# Patient Record
Sex: FEMALE | Race: BLACK OR AFRICAN AMERICAN
[De-identification: names, ages, dates, MRNs, and addresses within clinical notes are randomized per-mention and may not be internally consistent; named-entity substitution may affect disease eponyms.]

---

## 2019-11-27 ENCOUNTER — HOSPITAL ENCOUNTER (EMERGENCY)
Dept: HOSPITAL 35 - ER | Age: 39
Discharge: HOME | End: 2019-11-27
Payer: COMMERCIAL

## 2019-11-27 VITALS — BODY MASS INDEX: 46.65 KG/M2 | HEIGHT: 65 IN | WEIGHT: 280.01 LBS

## 2019-11-27 VITALS — DIASTOLIC BLOOD PRESSURE: 94 MMHG | SYSTOLIC BLOOD PRESSURE: 204 MMHG

## 2019-11-27 DIAGNOSIS — E66.9: ICD-10-CM

## 2019-11-27 DIAGNOSIS — J45.901: Primary | ICD-10-CM

## 2019-11-27 DIAGNOSIS — Z98.890: ICD-10-CM

## 2019-11-27 DIAGNOSIS — I10: ICD-10-CM

## 2019-11-27 DIAGNOSIS — Z88.8: ICD-10-CM

## 2019-11-27 NOTE — EKG
[place] [address] [facility]
[place]  [address]
Phone:  [phone]                    ELECTROCARDIOGRAM REPORT      
_______________________________________________________________________________
 
Name:       PAXTON SALAS         [facility]                     [id number]     Account #:      [id number]  
Admission:  19    Attend Phys:                          
Discharge:  19    Date of Birth:  [birth year]  
                                                          Report #: [id number]
   [id number]
_______________________________________________________________________________
THIS REPORT FOR:   //name//                          
 
                         [facility] ED
                                       
Test Date:    2019               Test Time:    01:47:19
Pat Name:     PAXTON SALAS            Department:   
Patient ID:   [id number]            Room:          
Gender:       F                        Technician:   Kj
:          1980               Requested By: Kvng Jackson
Order Number: [id number] MD:   Dereck Ratliff
                                 Measurements
Intervals                              Axis          
Rate:         86                       P:            -14
AR:           146                      QRS:          43
QRSD:         88                       T:            -43
QT:           356                                    
QTc:          426                                    
                           Interpretive Statements
Sinus rhythm
Nonspecific T abnormalities
Compared to ECG 2016 06:51:35
No significant changes
 
Electronically Signed On 2019 7:44:10 CST by Dereck Ratliff
[email or web address]
 
 
 
 
 
 
 
 
 
 
 
 
 
 
 
 
 
 
  <ELECTRONICALLY SIGNED>
   By: Dereck Ratliff MD, [facility]   
  19     0744
D: 19 0147                           _____________________________________
T: 19 014                           Dereck Ratliff MD, FACC     /EPI

## 2020-06-17 ENCOUNTER — HOSPITAL ENCOUNTER (EMERGENCY)
Dept: HOSPITAL 35 - ER | Age: 40
Discharge: HOME | End: 2020-06-17
Payer: COMMERCIAL

## 2020-06-17 VITALS — HEIGHT: 66 IN | BODY MASS INDEX: 45 KG/M2 | WEIGHT: 280.01 LBS

## 2020-06-17 VITALS — SYSTOLIC BLOOD PRESSURE: 177 MMHG | DIASTOLIC BLOOD PRESSURE: 90 MMHG

## 2020-06-17 DIAGNOSIS — Z98.890: ICD-10-CM

## 2020-06-17 DIAGNOSIS — E11.9: ICD-10-CM

## 2020-06-17 DIAGNOSIS — R06.02: ICD-10-CM

## 2020-06-17 DIAGNOSIS — J45.909: ICD-10-CM

## 2020-06-17 DIAGNOSIS — I10: ICD-10-CM

## 2020-06-17 DIAGNOSIS — Z88.8: ICD-10-CM

## 2020-06-17 DIAGNOSIS — R07.9: Primary | ICD-10-CM

## 2020-06-17 DIAGNOSIS — Z79.899: ICD-10-CM

## 2020-06-17 LAB
ALBUMIN SERPL-MCNC: 3.3 G/DL (ref 3.4–5)
ALT SERPL-CCNC: 43 U/L (ref 30–65)
ANION GAP SERPL CALC-SCNC: 8 MMOL/L (ref 7–16)
AST SERPL-CCNC: 25 U/L (ref 15–37)
BASOPHILS NFR BLD AUTO: 2.2 % (ref 0–2)
BILIRUB SERPL-MCNC: 0.6 MG/DL (ref 0.2–1)
BUN SERPL-MCNC: 12 MG/DL (ref 7–18)
CALCIUM SERPL-MCNC: 9 MG/DL (ref 8.5–10.1)
CHLORIDE SERPL-SCNC: 96 MMOL/L (ref 98–107)
CO2 SERPL-SCNC: 28 MMOL/L (ref 21–32)
CREAT SERPL-MCNC: 0.8 MG/DL (ref 0.6–1)
EOSINOPHIL NFR BLD: 1.7 % (ref 0–3)
ERYTHROCYTE [DISTWIDTH] IN BLOOD BY AUTOMATED COUNT: 17.9 % (ref 10.5–14.5)
GLUCOSE SERPL-MCNC: 246 MG/DL (ref 74–106)
GRANULOCYTES NFR BLD MANUAL: 39.9 % (ref 36–66)
HCT VFR BLD CALC: 39.6 % (ref 37–47)
HGB BLD-MCNC: 12.7 GM/DL (ref 12–15)
LIPASE: 165 U/L (ref 73–393)
LYMPHOCYTES NFR BLD AUTO: 48.1 % (ref 24–44)
MCH RBC QN AUTO: 26.3 PG (ref 26–34)
MCHC RBC AUTO-ENTMCNC: 32.1 G/DL (ref 28–37)
MCV RBC: 81.8 FL (ref 80–100)
MONOCYTES NFR BLD: 8.1 % (ref 1–8)
NEUTROPHILS # BLD: 2.7 THOU/UL (ref 1.4–8.2)
PLATELET # BLD: 401 THOU/UL (ref 150–400)
POTASSIUM SERPL-SCNC: 3.6 MMOL/L (ref 3.5–5.1)
PROT SERPL-MCNC: 7.9 G/DL (ref 6.4–8.2)
RBC # BLD AUTO: 4.84 MIL/UL (ref 4.2–5)
SODIUM SERPL-SCNC: 132 MMOL/L (ref 136–145)
TROPONIN I SERPL-MCNC: <0.06 NG/ML (ref ?–0.06)
WBC # BLD AUTO: 6.8 THOU/UL (ref 4–11)

## 2020-06-17 NOTE — EKG
[facility]
[address]
[place]   [address]                     ELECTROCARDIOGRAM REPORT      
_______________________________________________________________________________
 
Name:       PAXTON SALAS         Room #:                     DEP [facility]#:      [id number]                       Account #:      [id number]  
Admission:  20    Attend Phys:                          
Discharge:  20    Date of Birth:  [birth year]  
                                                          Report #: [id number]
                                                                    [id number]
_______________________________________________________________________________
THIS REPORT FOR:  
 
cc:  Nisha Guajardo MD, Karla L. MD Lundgren,Dereck CARVAJAL MD [facility]
THIS REPORT FOR:   //name//                          
 
                         [facility] ED
                                       
Test Date:    2020               Test Time:    03:45:27
Pat Name:     PAXTON SALAS            Department:   
Patient ID:   [id number]            Room:          
Gender:       F                        Technician:   
:          1980               Requested By: Kishan Bridges
Order Number: [id number] MD:   Dereck Ratliff
                                 Measurements
Intervals                              Axis          
Rate:         85                       P:            43
WA:           142                      QRS:          49
QRSD:         86                       T:            -5
QT:           361                                    
QTc:          430                                    
                           Interpretive Statements
Sinus rhythm
No significant abnormality
Compared to ECG 2019 01:47:19
T-wave abnormality no longer present
 
Electronically Signed On 2020 7:43:23 CDT by Dereck Ratliff
[email or web address]
 
 
 
 
 
 
 
 
 
 
 
 
 
 
  <ELECTRONICALLY SIGNED>
   By: Dereck Ratliff MD, FACC   
  20     0743
D: 20 0345                           _____________________________________
T: 20 0345                           Dereck Ratliff MD, [facility]     /EPI